# Patient Record
Sex: MALE | Race: ASIAN | NOT HISPANIC OR LATINO | URBAN - METROPOLITAN AREA
[De-identification: names, ages, dates, MRNs, and addresses within clinical notes are randomized per-mention and may not be internally consistent; named-entity substitution may affect disease eponyms.]

---

## 2019-10-09 ENCOUNTER — EMERGENCY (EMERGENCY)
Facility: HOSPITAL | Age: 54
LOS: 1 days | Discharge: HOME | End: 2019-10-09
Admitting: EMERGENCY MEDICINE
Payer: COMMERCIAL

## 2019-10-09 VITALS
OXYGEN SATURATION: 100 % | SYSTOLIC BLOOD PRESSURE: 111 MMHG | RESPIRATION RATE: 18 BRPM | HEART RATE: 77 BPM | DIASTOLIC BLOOD PRESSURE: 92 MMHG | TEMPERATURE: 98 F

## 2019-10-09 DIAGNOSIS — Z98.89 OTHER SPECIFIED POSTPROCEDURAL STATES: Chronic | ICD-10-CM

## 2019-10-09 PROCEDURE — 99284 EMERGENCY DEPT VISIT MOD MDM: CPT

## 2019-10-09 PROCEDURE — 73564 X-RAY EXAM KNEE 4 OR MORE: CPT | Mod: 26,RT

## 2019-10-09 RX ORDER — DICLOFENAC SODIUM 75 MG/1
1 TABLET, DELAYED RELEASE ORAL
Qty: 10 | Refills: 0
Start: 2019-10-09 | End: 2019-10-13

## 2019-10-09 RX ORDER — KETOROLAC TROMETHAMINE 30 MG/ML
30 SYRINGE (ML) INJECTION ONCE
Refills: 0 | Status: DISCONTINUED | OUTPATIENT
Start: 2019-10-09 | End: 2019-10-09

## 2019-10-09 RX ADMIN — Medication 30 MILLIGRAM(S): at 07:54

## 2019-10-09 NOTE — ED PROVIDER NOTE - PATIENT PORTAL LINK FT
You can access the FollowMyHealth Patient Portal offered by [facility] by registering at the following website: [email or web address]. By joining [employer]’s FollowMyHealth portal, you will also be able to view your health information using other applications (apps) compatible with our system.

## 2019-10-09 NOTE — ED ADULT NURSE NOTE - OBJECTIVE STATEMENT
Pt is a 53 year old male reporting tot he ED for right knee pain. Pt reports 3 weeks of right knee pain increasing last night. PT reports pain is 4/10 when sitting. Pt increases to 10/10 when ambulating. PT reports he drove to ED on his own, ambulated to room 10 c. Pt denies trauma or injury to right knee, no swelling or skin breakdown noted. Pt is AOX4. Pt denies chest pain or SOB. PT respirations even an unlabored. Pt appears to be comfortable, in NAD. Pt denies fever, chills, n/v/d. Pt denies abdominal pain, dysuria, hematuria.

## 2019-10-09 NOTE — ED PROVIDER NOTE - CLINICAL SUMMARY MEDICAL DECISION MAKING FREE TEXT BOX
54 y/o male c/o right knee pain x 2 weeks worsening over past 2 days  -probable meniscal/ligamental, r/o bony pathology  -xrays, toradol  -outpt ortho follow up

## 2019-10-09 NOTE — ED ADULT TRIAGE NOTE - CHIEF COMPLAINT QUOTE
C/o Right knee pain since last week which is worse since Yesterday. Pain is severe with activity. No swelling noted.  Also c/o right hip pain. PMH of HTN, cervical disc herniation with laminectomy. Lower back pain.

## 2019-10-09 NOTE — ED PROVIDER NOTE - OBJECTIVE STATEMENT
54 y/o male hx HTN presents to ER c/o right knee pain. Pt. 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 pt. recent trauma or fall. Denies fever chills redness swelling. States has not taken anything for symptoms.

## 2019-10-11 ENCOUNTER — APPOINTMENT (OUTPATIENT)
Dept: ORTHOPEDIC SURGERY | Facility: CLINIC | Age: 54
End: 2019-10-11
Payer: COMMERCIAL

## 2019-10-11 VITALS
HEIGHT: 63 IN | HEART RATE: 86 BPM | WEIGHT: 175 LBS | SYSTOLIC BLOOD PRESSURE: 118 MMHG | BODY MASS INDEX: 31.01 KG/M2 | DIASTOLIC BLOOD PRESSURE: 81 MMHG

## 2019-10-11 DIAGNOSIS — Z86.39 PERSONAL HISTORY OF OTHER ENDOCRINE, NUTRITIONAL AND METABOLIC DISEASE: ICD-10-CM

## 2019-10-11 DIAGNOSIS — M17.11 UNILATERAL PRIMARY OSTEOARTHRITIS, RIGHT KNEE: ICD-10-CM

## 2019-10-11 DIAGNOSIS — M50.20 OTHER CERVICAL DISC DISPLACEMENT, UNSPECIFIED CERVICAL REGION: ICD-10-CM

## 2019-10-11 DIAGNOSIS — Z82.61 FAMILY HISTORY OF ARTHRITIS: ICD-10-CM

## 2019-10-11 DIAGNOSIS — Z80.9 FAMILY HISTORY OF MALIGNANT NEOPLASM, UNSPECIFIED: ICD-10-CM

## 2019-10-11 PROCEDURE — 73564 X-RAY EXAM KNEE 4 OR MORE: CPT | Mod: RT

## 2019-10-11 PROCEDURE — 99204 OFFICE O/P NEW MOD 45 MIN: CPT

## 2019-10-11 RX ORDER — DICLOFENAC SODIUM 75 MG/1
75 TABLET, DELAYED RELEASE ORAL
Qty: 1 | Refills: 0 | Status: ACTIVE | COMMUNITY
Start: 2019-10-11 | End: 1900-01-01

## 2019-10-12 ENCOUNTER — TRANSCRIPTION ENCOUNTER (OUTPATIENT)
Age: 54
End: 2019-10-12

## 2019-10-15 RX ORDER — LISINOPRIL 20 MG/1
20 TABLET ORAL
Refills: 0 | Status: ACTIVE | COMMUNITY

## 2019-10-15 RX ORDER — BROMFENAC 0.76 MG/ML
SOLUTION/ DROPS OPHTHALMIC
Refills: 0 | Status: ACTIVE | COMMUNITY

## 2019-10-15 RX ORDER — DICLOFENAC SODIUM 1 %
KIT TOPICAL
Refills: 0 | Status: ACTIVE | COMMUNITY

## 2021-03-11 ENCOUNTER — APPOINTMENT (OUTPATIENT)
Dept: ORTHOPEDIC SURGERY | Facility: CLINIC | Age: 56
End: 2021-03-11

## 2021-08-07 NOTE — ED ADULT TRIAGE NOTE - HEART RATE (BEATS/MIN)
Patient arrived back from [facility] with cath lab staff - the patient is complaining of 9.5/10 pain in the right arm. Patient awake and talking. VSS. Will treat pain with PRN medication and collaborate with MD as needed to meet pain goal.    77

## 2023-05-07 ENCOUNTER — INPATIENT (INPATIENT)
Facility: HOSPITAL | Age: 58
LOS: 0 days | Discharge: HOME | End: 2023-05-08
Attending: INTERNAL MEDICINE | Admitting: INTERNAL MEDICINE
Payer: COMMERCIAL

## 2023-05-07 VITALS
OXYGEN SATURATION: 100 % | HEART RATE: 126 BPM | SYSTOLIC BLOOD PRESSURE: 132 MMHG | DIASTOLIC BLOOD PRESSURE: 89 MMHG | RESPIRATION RATE: 18 BRPM | TEMPERATURE: 99 F

## 2023-05-07 DIAGNOSIS — R07.9 CHEST PAIN, UNSPECIFIED: ICD-10-CM

## 2023-05-07 DIAGNOSIS — Z98.89 OTHER SPECIFIED POSTPROCEDURAL STATES: Chronic | ICD-10-CM

## 2023-05-07 LAB
ALBUMIN SERPL ELPH-MCNC: 4.1 G/DL — SIGNIFICANT CHANGE UP (ref 3.3–5)
ALP SERPL-CCNC: 57 U/L — SIGNIFICANT CHANGE UP (ref 40–120)
ALT FLD-CCNC: 34 U/L — SIGNIFICANT CHANGE UP (ref 4–41)
ANION GAP SERPL CALC-SCNC: 16 MMOL/L — HIGH (ref 7–14)
AST SERPL-CCNC: 47 U/L — HIGH (ref 4–40)
BASOPHILS # BLD AUTO: 0.1 K/UL — SIGNIFICANT CHANGE UP (ref 0–0.2)
BASOPHILS NFR BLD AUTO: 1.1 % — SIGNIFICANT CHANGE UP (ref 0–2)
BILIRUB SERPL-MCNC: <0.2 MG/DL — SIGNIFICANT CHANGE UP (ref 0.2–1.2)
BUN SERPL-MCNC: 17 MG/DL — SIGNIFICANT CHANGE UP (ref 7–23)
CALCIUM SERPL-MCNC: 8.8 MG/DL — SIGNIFICANT CHANGE UP (ref 8.4–10.5)
CHLORIDE SERPL-SCNC: 105 MMOL/L — SIGNIFICANT CHANGE UP (ref 98–107)
CO2 SERPL-SCNC: 20 MMOL/L — LOW (ref 22–31)
CREAT SERPL-MCNC: 0.87 MG/DL — SIGNIFICANT CHANGE UP (ref 0.5–1.3)
D DIMER BLD IA.RAPID-MCNC: 329 NG/ML DDU — HIGH
EGFR: 99 ML/MIN/1.73M2 — SIGNIFICANT CHANGE UP
EOSINOPHIL # BLD AUTO: 0.37 K/UL — SIGNIFICANT CHANGE UP (ref 0–0.5)
EOSINOPHIL NFR BLD AUTO: 4.1 % — SIGNIFICANT CHANGE UP (ref 0–6)
GLUCOSE SERPL-MCNC: 148 MG/DL — HIGH (ref 70–99)
HCT VFR BLD CALC: 37.8 % — LOW (ref 39–50)
HGB BLD-MCNC: 12.6 G/DL — LOW (ref 13–17)
IANC: 3.53 K/UL — SIGNIFICANT CHANGE UP (ref 1.8–7.4)
IMM GRANULOCYTES NFR BLD AUTO: 0.1 % — SIGNIFICANT CHANGE UP (ref 0–0.9)
LIDOCAIN IGE QN: 64 U/L — HIGH (ref 7–60)
LYMPHOCYTES # BLD AUTO: 4.29 K/UL — HIGH (ref 1–3.3)
LYMPHOCYTES # BLD AUTO: 47.1 % — HIGH (ref 13–44)
MAGNESIUM SERPL-MCNC: 1.8 MG/DL — SIGNIFICANT CHANGE UP (ref 1.6–2.6)
MCHC RBC-ENTMCNC: 27.2 PG — SIGNIFICANT CHANGE UP (ref 27–34)
MCHC RBC-ENTMCNC: 33.3 GM/DL — SIGNIFICANT CHANGE UP (ref 32–36)
MCV RBC AUTO: 81.5 FL — SIGNIFICANT CHANGE UP (ref 80–100)
MONOCYTES # BLD AUTO: 0.8 K/UL — SIGNIFICANT CHANGE UP (ref 0–0.9)
MONOCYTES NFR BLD AUTO: 8.8 % — SIGNIFICANT CHANGE UP (ref 2–14)
NEUTROPHILS # BLD AUTO: 3.53 K/UL — SIGNIFICANT CHANGE UP (ref 1.8–7.4)
NEUTROPHILS NFR BLD AUTO: 38.8 % — LOW (ref 43–77)
NRBC # BLD: 0 /100 WBCS — SIGNIFICANT CHANGE UP (ref 0–0)
NRBC # FLD: 0 K/UL — SIGNIFICANT CHANGE UP (ref 0–0)
NT-PROBNP SERPL-SCNC: 30 PG/ML — SIGNIFICANT CHANGE UP
PLATELET # BLD AUTO: 159 K/UL — SIGNIFICANT CHANGE UP (ref 150–400)
POTASSIUM SERPL-MCNC: 4.6 MMOL/L — SIGNIFICANT CHANGE UP (ref 3.5–5.3)
POTASSIUM SERPL-SCNC: 4.6 MMOL/L — SIGNIFICANT CHANGE UP (ref 3.5–5.3)
PROT SERPL-MCNC: 6.8 G/DL — SIGNIFICANT CHANGE UP (ref 6–8.3)
RBC # BLD: 4.64 M/UL — SIGNIFICANT CHANGE UP (ref 4.2–5.8)
RBC # FLD: 14.5 % — SIGNIFICANT CHANGE UP (ref 10.3–14.5)
SODIUM SERPL-SCNC: 141 MMOL/L — SIGNIFICANT CHANGE UP (ref 135–145)
TROPONIN T, HIGH SENSITIVITY RESULT: 6 NG/L — SIGNIFICANT CHANGE UP
TROPONIN T, HIGH SENSITIVITY RESULT: 8 NG/L — SIGNIFICANT CHANGE UP
TSH SERPL-MCNC: 0.76 UIU/ML — SIGNIFICANT CHANGE UP (ref 0.27–4.2)
WBC # BLD: 9.1 K/UL — SIGNIFICANT CHANGE UP (ref 3.8–10.5)
WBC # FLD AUTO: 9.1 K/UL — SIGNIFICANT CHANGE UP (ref 3.8–10.5)

## 2023-05-07 PROCEDURE — 70450 CT HEAD/BRAIN W/O DYE: CPT | Mod: 26,MA

## 2023-05-07 PROCEDURE — 99285 EMERGENCY DEPT VISIT HI MDM: CPT

## 2023-05-07 PROCEDURE — 71250 CT THORAX DX C-: CPT | Mod: 26,MA

## 2023-05-07 PROCEDURE — 71046 X-RAY EXAM CHEST 2 VIEWS: CPT | Mod: 26

## 2023-05-07 PROCEDURE — 93970 EXTREMITY STUDY: CPT | Mod: 26

## 2023-05-07 RX ORDER — ASPIRIN/CALCIUM CARB/MAGNESIUM 324 MG
162 TABLET ORAL ONCE
Refills: 0 | Status: COMPLETED | OUTPATIENT
Start: 2023-05-07 | End: 2023-05-07

## 2023-05-07 RX ORDER — SODIUM CHLORIDE 9 MG/ML
1000 INJECTION, SOLUTION INTRAVENOUS ONCE
Refills: 0 | Status: COMPLETED | OUTPATIENT
Start: 2023-05-07 | End: 2023-05-07

## 2023-05-07 RX ADMIN — SODIUM CHLORIDE 1000 MILLILITER(S): 9 INJECTION, SOLUTION INTRAVENOUS at 18:49

## 2023-05-07 RX ADMIN — SODIUM CHLORIDE 1000 MILLILITER(S): 9 INJECTION, SOLUTION INTRAVENOUS at 21:56

## 2023-05-07 RX ADMIN — Medication 162 MILLIGRAM(S): at 20:21

## 2023-05-07 NOTE — ED ADULT NURSE NOTE - OBJECTIVE STATEMENT
Pt AOX4 c/o chest pain, started yesterday, got worse today; pt's wife also reports that pt seemed especially fatigued today and didn't want to come to ER; intermittent SOB when chest pain is strong; at this time pain is 5/10; breathing regular and unlabored; NSR on monitor; pt has severe allergy to contrast die:  went into cardiac arrest when given dye in the past; pt had a recent double hernia repair surgery on April 19 ambulatory surgery; surgery was unremarkable, incisions still healing, dry and clean; no calf pain, no long trips, no smoking. 20G placed in Left AC, labs drawn and sent, awaiting further MD orders.

## 2023-05-07 NOTE — ED ADULT NURSE REASSESSMENT NOTE - NS ED NURSE REASSESS COMMENT FT1
Patient is awake and alert, appears comfortable and in no apparent distress. Presented to ED for chest pain, weakness and lower extremity pain. Denies any s/sx at this time. Awaiting further orders from MD.

## 2023-05-07 NOTE — ED ADULT TRIAGE NOTE - CHIEF COMPLAINT QUOTE
Presents to ED c/o chest pain x 2 days. As per daughter he is having episodes of confusion. Pt A&Ox4 in triage, no neuro deficits noted. Sent from cardiologist. PMH of HTN, HLD, DM2.

## 2023-05-07 NOTE — ED PROVIDER NOTE - OBJECTIVE STATEMENT
57-year-old male with past medical history of hypertension, hyperlipidemia, diabetes presenting with left-sided chest pain since yesterday. Patient states he began having pain in the left side of his chest which was pressure-like with radiation down his left arm. Patient states he took some aspirin with mild improvement of the chest pain. Family notes that patient has not been acting like himself for the last 2 days. States that has been more forgetful than usual. Patient states that he also had some pain in his right leg which lasted for few hours and went away. He denies nausea, vomiting, syncope. He denies fever or chills. Patient states he had bilateral hernia repair about 2 weeks prior. Patient states he is not having chest pain right now tachycardic on triage heart rate 133. Patient reports anaphylaxis to IV contrast 20 years prior

## 2023-05-07 NOTE — ED PROVIDER NOTE - CLINICAL SUMMARY MEDICAL DECISION MAKING FREE TEXT BOX
57-year-old male with past medical history of hypertension, hyperlipidemia, diabetes presenting with left-sided chest pain since yesterday. Patient states he began having pain in the left side of his chest which was pressure-like with radiation down his left arm.Patient with chest pain along with tachycardia with recent leg pain as well. Concern for possible DVT, PE patient with anaphylaxis to IV contrast. We'll check D-dimer, bilateral ultrasounds lower duplex. Patient also with chest pain potential of ACS. Patient's heart score is 4+ will obtain CBC, CMP, troponin, PT/INR, will give patient aspirin. Will obtain CT head and CT chest as patient complained of complaint of chest pain and confusion per family. Low suspicion of aortic dissection but will check bilateral upper extremity blood pressures absolute contraindication to IV contrast may require additional inpatient testing will require admission with telemetry. Likely echo

## 2023-05-07 NOTE — ED PROVIDER NOTE - PHYSICAL EXAMINATION
Gen: Awake, Alert, WD, WN, NAD  Head:  NC/AT  Eyes:  PERRL, EOMI, Conjunctiva pink, lids normal, no scleral icterus  ENT: OP clear, no exudates, no erythema, uvula midline, TMs clear bilaterally, moist mucus membranes  Neck: supple, nontender, no meningismus, no JVD, trachea midline  Cardiac/CV:  S1 S2, tachycardic no M/G/R  Respiratory/Pulm:  CTAB, good air movement, normal resp effort, no wheezes/stridor/retractions/rales/rhonchi  Gastrointestinal/Abdomen:  Soft, nontender, nondistended, +BS, no rebound/guarding  Back:  no CVAT, no MLT  Ext:  warm, well perfused, moving all extremities spontaneously, no peripheral edema, distal pulses intact  Skin: b/l inguinal hernia  incisions c/d/i   Neuro:  AAOx3, sensation intact, motor 5/5 x 4 extremities, normal gait, speech clear

## 2023-05-07 NOTE — ED PROVIDER NOTE - NS ED ROS FT
denies fever, chills, +chest pain,+ SOB, abdominal pain, diarrhea, dysuria, syncope, bleeding, new rash,weakness, numbness, blurred vision    ROS  otherwise negative as per HPI

## 2023-05-07 NOTE — ED PROVIDER NOTE - ATTENDING CONTRIBUTION TO CARE
attending wrote note  Dr. Lee: I have personally seen and examined this patient at the bedside. I have fully participated in the care of this patient. I have reviewed all pertinent clinical information, including history, physical exam, plan and the Resident's note and agree except as noted. HPI above as by me. PE above as by me. DDX PLAN

## 2023-05-08 ENCOUNTER — TRANSCRIPTION ENCOUNTER (OUTPATIENT)
Age: 58
End: 2023-05-08

## 2023-05-08 VITALS
DIASTOLIC BLOOD PRESSURE: 91 MMHG | SYSTOLIC BLOOD PRESSURE: 132 MMHG | RESPIRATION RATE: 18 BRPM | HEART RATE: 102 BPM | OXYGEN SATURATION: 100 %

## 2023-05-08 DIAGNOSIS — E11.9 TYPE 2 DIABETES MELLITUS WITHOUT COMPLICATIONS: ICD-10-CM

## 2023-05-08 DIAGNOSIS — E78.5 HYPERLIPIDEMIA, UNSPECIFIED: ICD-10-CM

## 2023-05-08 DIAGNOSIS — Z79.899 OTHER LONG TERM (CURRENT) DRUG THERAPY: ICD-10-CM

## 2023-05-08 DIAGNOSIS — R91.8 OTHER NONSPECIFIC ABNORMAL FINDING OF LUNG FIELD: ICD-10-CM

## 2023-05-08 DIAGNOSIS — R07.9 CHEST PAIN, UNSPECIFIED: ICD-10-CM

## 2023-05-08 DIAGNOSIS — I10 ESSENTIAL (PRIMARY) HYPERTENSION: ICD-10-CM

## 2023-05-08 DIAGNOSIS — Z29.9 ENCOUNTER FOR PROPHYLACTIC MEASURES, UNSPECIFIED: ICD-10-CM

## 2023-05-08 LAB
ALBUMIN SERPL ELPH-MCNC: 3.7 G/DL — SIGNIFICANT CHANGE UP (ref 3.3–5)
ALP SERPL-CCNC: 49 U/L — SIGNIFICANT CHANGE UP (ref 40–120)
ALT FLD-CCNC: 28 U/L — SIGNIFICANT CHANGE UP (ref 4–41)
ANION GAP SERPL CALC-SCNC: 12 MMOL/L — SIGNIFICANT CHANGE UP (ref 7–14)
AST SERPL-CCNC: 29 U/L — SIGNIFICANT CHANGE UP (ref 4–40)
BILIRUB SERPL-MCNC: 0.7 MG/DL — SIGNIFICANT CHANGE UP (ref 0.2–1.2)
BUN SERPL-MCNC: 18 MG/DL — SIGNIFICANT CHANGE UP (ref 7–23)
CALCIUM SERPL-MCNC: 8.6 MG/DL — SIGNIFICANT CHANGE UP (ref 8.4–10.5)
CHLORIDE SERPL-SCNC: 104 MMOL/L — SIGNIFICANT CHANGE UP (ref 98–107)
CHOLEST SERPL-MCNC: 176 MG/DL — SIGNIFICANT CHANGE UP
CO2 SERPL-SCNC: 23 MMOL/L — SIGNIFICANT CHANGE UP (ref 22–31)
CREAT SERPL-MCNC: 0.95 MG/DL — SIGNIFICANT CHANGE UP (ref 0.5–1.3)
EGFR: 92 ML/MIN/1.73M2 — SIGNIFICANT CHANGE UP
GLUCOSE SERPL-MCNC: 149 MG/DL — HIGH (ref 70–99)
HDLC SERPL-MCNC: 86 MG/DL — SIGNIFICANT CHANGE UP
LIPID PNL WITH DIRECT LDL SERPL: 75 MG/DL — SIGNIFICANT CHANGE UP
NON HDL CHOLESTEROL: 90 MG/DL — SIGNIFICANT CHANGE UP
POTASSIUM SERPL-MCNC: 4 MMOL/L — SIGNIFICANT CHANGE UP (ref 3.5–5.3)
POTASSIUM SERPL-SCNC: 4 MMOL/L — SIGNIFICANT CHANGE UP (ref 3.5–5.3)
PROT SERPL-MCNC: 6.2 G/DL — SIGNIFICANT CHANGE UP (ref 6–8.3)
SODIUM SERPL-SCNC: 139 MMOL/L — SIGNIFICANT CHANGE UP (ref 135–145)
TRIGL SERPL-MCNC: 73 MG/DL — SIGNIFICANT CHANGE UP
TROPONIN T, HIGH SENSITIVITY RESULT: 7 NG/L — SIGNIFICANT CHANGE UP

## 2023-05-08 PROCEDURE — 99223 1ST HOSP IP/OBS HIGH 75: CPT

## 2023-05-08 PROCEDURE — 93306 TTE W/DOPPLER COMPLETE: CPT | Mod: 26

## 2023-05-08 PROCEDURE — 78582 LUNG VENTILAT&PERFUS IMAGING: CPT | Mod: 26,GC

## 2023-05-08 RX ORDER — DEXTROSE 50 % IN WATER 50 %
15 SYRINGE (ML) INTRAVENOUS ONCE
Refills: 0 | Status: DISCONTINUED | OUTPATIENT
Start: 2023-05-08 | End: 2023-05-08

## 2023-05-08 RX ORDER — LISINOPRIL 2.5 MG/1
20 TABLET ORAL DAILY
Refills: 0 | Status: DISCONTINUED | OUTPATIENT
Start: 2023-05-08 | End: 2023-05-08

## 2023-05-08 RX ORDER — SODIUM CHLORIDE 9 MG/ML
1000 INJECTION, SOLUTION INTRAVENOUS
Refills: 0 | Status: DISCONTINUED | OUTPATIENT
Start: 2023-05-08 | End: 2023-05-08

## 2023-05-08 RX ORDER — ROSUVASTATIN CALCIUM 5 MG/1
1 TABLET ORAL
Refills: 0 | DISCHARGE

## 2023-05-08 RX ORDER — INSULIN LISPRO 100/ML
VIAL (ML) SUBCUTANEOUS AT BEDTIME
Refills: 0 | Status: DISCONTINUED | OUTPATIENT
Start: 2023-05-08 | End: 2023-05-08

## 2023-05-08 RX ORDER — INSULIN LISPRO 100/ML
VIAL (ML) SUBCUTANEOUS
Refills: 0 | Status: DISCONTINUED | OUTPATIENT
Start: 2023-05-08 | End: 2023-05-08

## 2023-05-08 RX ORDER — ENOXAPARIN SODIUM 100 MG/ML
40 INJECTION SUBCUTANEOUS EVERY 24 HOURS
Refills: 0 | Status: DISCONTINUED | OUTPATIENT
Start: 2023-05-08 | End: 2023-05-08

## 2023-05-08 RX ORDER — DEXTROSE 50 % IN WATER 50 %
25 SYRINGE (ML) INTRAVENOUS ONCE
Refills: 0 | Status: DISCONTINUED | OUTPATIENT
Start: 2023-05-08 | End: 2023-05-08

## 2023-05-08 RX ORDER — ASPIRIN/CALCIUM CARB/MAGNESIUM 324 MG
81 TABLET ORAL DAILY
Refills: 0 | Status: DISCONTINUED | OUTPATIENT
Start: 2023-05-08 | End: 2023-05-08

## 2023-05-08 RX ORDER — ATORVASTATIN CALCIUM 80 MG/1
80 TABLET, FILM COATED ORAL AT BEDTIME
Refills: 0 | Status: DISCONTINUED | OUTPATIENT
Start: 2023-05-08 | End: 2023-05-08

## 2023-05-08 RX ORDER — ASPIRIN/CALCIUM CARB/MAGNESIUM 324 MG
1 TABLET ORAL
Qty: 0 | Refills: 0 | DISCHARGE
Start: 2023-05-08

## 2023-05-08 RX ORDER — LISINOPRIL 2.5 MG/1
1 TABLET ORAL
Refills: 0 | DISCHARGE

## 2023-05-08 RX ORDER — GLUCAGON INJECTION, SOLUTION 0.5 MG/.1ML
1 INJECTION, SOLUTION SUBCUTANEOUS ONCE
Refills: 0 | Status: DISCONTINUED | OUTPATIENT
Start: 2023-05-08 | End: 2023-05-08

## 2023-05-08 RX ORDER — METFORMIN HYDROCHLORIDE 850 MG/1
1 TABLET ORAL
Refills: 0 | DISCHARGE

## 2023-05-08 RX ORDER — DEXTROSE 50 % IN WATER 50 %
12.5 SYRINGE (ML) INTRAVENOUS ONCE
Refills: 0 | Status: DISCONTINUED | OUTPATIENT
Start: 2023-05-08 | End: 2023-05-08

## 2023-05-08 RX ADMIN — Medication 81 MILLIGRAM(S): at 12:29

## 2023-05-08 RX ADMIN — ENOXAPARIN SODIUM 40 MILLIGRAM(S): 100 INJECTION SUBCUTANEOUS at 12:30

## 2023-05-08 RX ADMIN — LISINOPRIL 20 MILLIGRAM(S): 2.5 TABLET ORAL at 05:26

## 2023-05-08 NOTE — H&P ADULT - NSHPLABSRESULTS_GEN_ALL_CORE
12.6   9.10  )-----------( 159      ( 07 May 2023 18:15 )             37.8     05-07    141  |  105  |  17  ----------------------------<  148<H>  4.6   |  20<L>  |  0.87    Ca    8.8      07 May 2023 18:15  Mg     1.80     05-07    TPro  6.8  /  Alb  4.1  /  TBili  <0.2  /  DBili  x   /  AST  47<H>  /  ALT  34  /  AlkPhos  57  05-07    CAPILLARY BLOOD GLUCOSE      POCT Blood Glucose.: 143 mg/dL (07 May 2023 17:40)        Vital Signs Last 24 Hrs  T(C): 36.7 (08 May 2023 00:54), Max: 37.1 (07 May 2023 17:34)  T(F): 98.1 (08 May 2023 00:54), Max: 98.8 (07 May 2023 17:34)  HR: 91 (08 May 2023 00:54) (91 - 126)  BP: 155/100 (08 May 2023 00:54) (132/89 - 164/80)  BP(mean): --  RR: 17 (08 May 2023 00:54) (17 - 18)  SpO2: 100% (08 May 2023 00:54) (100% - 100%)    Parameters below as of 08 May 2023 00:54  Patient On (Oxygen Delivery Method): room air

## 2023-05-08 NOTE — DISCHARGE NOTE PROVIDER - CARE PROVIDER_API CALL
Sudha Victoria  INTERNAL MEDICINE  [address] [place] [address]  Phone: [phone]  Fax: [phone]  Established Patient  Follow Up Time: 1 week    Sae Fulton)  Internal Medicine  [address] [place] [address]  Phone: [phone]  Fax: [phone]  Established Patient  Follow Up Time: 1 week

## 2023-05-08 NOTE — CONSULT NOTE ADULT - SUBJECTIVE AND OBJECTIVE BOX
Cardiovascular Disease Initial Evaluation  Date of Service: 05-08-23 @ 09:28    CHIEF COMPLAINT: Chest pain    HISTORY OF PRESENT ILLNESS:  This is a 57 year old man, former smoker, HTN, HLD, and DM2 who presented to [facility] on 5/7/2023 with 2 episodes of non-exertional left sided cp radiating to arm in last 1-2 days. Each episode lasted approx. 1 hour and subsided spontaneously. Denies fever, cough, leg swelling. Reported right  leg pain to ed earlier, duplex performed negative for dvt.  EKG LAFB, (seen on 2011 ekg) sinus tach 118 bpm. Pt does report feeling anxious currently. HS trop 6, 8. Does not take aspirin daily. Denies cp now. CT chest non-con unremarkable save for right sided pulm nodules   CT head performed as family  reported to ed pt not acting himself recently; CT head unremarkable, pt a/o x 3 on my exam, somewhat anxious affect    Allergies  iodine (Anaphylaxis)  iodine (Anaphylaxis)        MEDICATIONS:  aspirin enteric coated 81 milliGRAM(s) Oral daily  enoxaparin Injectable 40 milliGRAM(s) SubCutaneous every 24 hours  lisinopril 20 milliGRAM(s) Oral daily            atorvastatin 80 milliGRAM(s) Oral at bedtime  dextrose 50% Injectable 25 Gram(s) IV Push once  dextrose 50% Injectable 12.5 Gram(s) IV Push once  dextrose 50% Injectable 25 Gram(s) IV Push once  dextrose Oral Gel 15 Gram(s) Oral once PRN  glucagon  Injectable 1 milliGRAM(s) IntraMuscular once  insulin lispro (ADMELOG) corrective regimen sliding scale   SubCutaneous three times a day before meals  insulin lispro (ADMELOG) corrective regimen sliding scale   SubCutaneous at bedtime    dextrose 5%. 1000 milliLiter(s) IV Continuous <Continuous>  dextrose 5%. 1000 milliLiter(s) IV Continuous <Continuous>      PAST MEDICAL & SURGICAL HISTORY:  DM (diabetes mellitus)      HTN (hypertension)      HLD (hyperlipidemia)      History of cervical spinal surgery  disectiony/fusion feb 2016; 8/2016 - laminectomy of @[facility];          FAMILY HISTORY:  No pertinent family history in first degree relatives        SOCIAL HISTORY:    The patient is a nonsmoker       REVIEW OF SYSTEMS:  See HPI, otherwise complete 14 point review of systems negative       PHYSICAL EXAM:  T(C): 36.4 (05-08-23 @ 07:30), Max: 37.1 (05-07-23 @ 17:34)  HR: 98 (05-08-23 @ 07:30) (91 - 126)  BP: 150/95 (05-08-23 @ 07:30) (132/89 - 164/80)  RR: 20 (05-08-23 @ 07:30) (17 - 20)  SpO2: 100% (05-08-23 @ 07:30) (99% - 100%)  Wt(kg): --  I&O's Summary      Appearance: No Acute Distress; resting comfortably  HEENT:  Normal oral mucosa, PERRL, EOMI	  Cardiovascular: Normal S1 S2, No JVD, No murmurs/rubs/gallops  Respiratory: Normal respiratory effort; Lungs clear to auscultation bilaterally  Gastrointestinal:  Soft, Non-tender, + BS	  Skin: No rashes, No ecchymoses, No cyanosis	  Neurologic: Non-focal; no weakness  Extremities: No clubbing, cyanosis or edema  Vascular: Peripheral pulses palpable 2+ bilaterally  Psychiatry: A & O x 3, Mood & affect appropriate    Laboratory Data:	 	    CBC Full  -  ( 07 May 2023 18:15 )  WBC Count : 9.10 K/uL  Hemoglobin : 12.6 g/dL  Hematocrit : 37.8 %  Platelet Count - Automated : 159 K/uL  Mean Cell Volume : 81.5 fL  Mean Cell Hemoglobin : 27.2 pg  Mean Cell Hemoglobin Concentration : 33.3 gm/dL  Auto Neutrophil # : 3.53 K/uL  Auto Lymphocyte # : 4.29 K/uL  Auto Monocyte # : 0.80 K/uL  Auto Eosinophil # : 0.37 K/uL  Auto Basophil # : 0.10 K/uL  Auto Neutrophil % : 38.8 %  Auto Lymphocyte % : 47.1 %  Auto Monocyte % : 8.8 %  Auto Eosinophil % : 4.1 %  Auto Basophil % : 1.1 %    05-08    139  |  104  |  18  ----------------------------<  149<H>  4.0   |  23  |  0.95  05-07    141  |  105  |  17  ----------------------------<  148<H>  4.6   |  20<L>  |  0.87    Ca    8.6      08 May 2023 07:10  Ca    8.8      07 May 2023 18:15  Mg     1.80     05-07    TPro  6.2  /  Alb  3.7  /  TBili  0.7  /  DBili  x   /  AST  29  /  ALT  28  /  AlkPhos  49  05-08  TPro  6.8  /  Alb  4.1  /  TBili  <0.2  /  DBili  x   /  AST  47<H>  /  ALT  34  /  AlkPhos  57  05-07          Interpretation of Telemetry: Sinus tachycardia	    ECG:  	Sinus tachycardia; LAFB    Assessment: 57 year old man, former smoker, HTN, HLD, and DM2 presents with chest pain.     Plan of Care:    #Chest pain-  Atypical for angina and patient has ruled out for ACS.   EKG shows a pre-existing LAFB.  Of note, Mr. Spivey has been persistently tachycardic on telemetry.  In my office, his HR is in the 60s.  Given atypical chest pain and elevated D-dimer, I will obtain a V/Q scan to assess for PE.  Patient with anaphylaxis to IV contrast.  Obtain echo.    #HTN-  BP elevated.  Continue lisinopril.  Hold off on up titration of meds until PE is ruled out.       Care discussed at length with patient in the ED.     72 minutes spent on total encounter; more than 50% of the visit was spent counseling and/or coordinating care by the attending physician.   	  Sae Fulton MD [facility]  Cardiovascular Diseases  [phone]    
    Patient is a 57y old  Male who presents with a chief complaint of chest pain       HPI:  58 yo m former smoker, HTN, HLD, DM2 presenting with 2 episodes of non-exertional left sided cp radiating to arm in last 1-2 days. Each episode lasted approx. 1 hour and subsided spontaneously. Denies fever, cough, leg swelling. Reported right  leg pain to ed earlier, duplex performed negative for dvt.  EKG LAFB, (seen on 2011 ekg) sinus tach 118 bpm. Pt does report feeling anxious currently. HS trop 6, 8. Does not take aspirin daily. Denies cp now.     Feeling better now. Wife in room.       PAST MEDICAL & SURGICAL HISTORY:  DM (diabetes mellitus)      HTN (hypertension)      HLD (hyperlipidemia)      History of cervical spinal surgery  disectiony/fusion feb 2016; 8/2016 - laminectomy of @[facility];          Social History: Quit smoking 25 years ago.     FAMILY HISTORY:  No pertinent family history in first degree relatives        Allergies    iodine (Anaphylaxis)  iodine (Anaphylaxis)    Intolerances        REVIEW OF SYSTEMS:    CONSTITUTIONAL: No fever, weight loss, or fatigue  EYES: No eye pain, visual disturbances, or discharge  RESPIRATORY: No cough, wheezing, chills or hemoptysis; No shortness of breath  CARDIOVASCULAR: No chest pain, palpitations, dizziness, or leg swelling  GASTROINTESTINAL: No abdominal or epigastric pain. No nausea, vomiting, or hematemesis; No diarrhea or constipation. No melena or hematochezia.  GENITOURINARY: No dysuria, frequency, hematuria, or incontinence  NEUROLOGICAL: No headaches, memory loss, loss of strength, numbness, or tremors  SKIN: No itching, burning, rashes, or lesions   ENDOCRINE: No heat or cold intolerance; No hair loss  MUSCULOSKELETAL: No joint pain or swelling; No muscle, back, or extremity pain  PSYCHIATRIC: No depression, anxiety, mood swings, or difficulty sleeping      MEDICATIONS  (STANDING):  aspirin enteric coated 81 milliGRAM(s) Oral daily  atorvastatin 80 milliGRAM(s) Oral at bedtime  dextrose 5%. 1000 milliLiter(s) (100 mL/Hr) IV Continuous <Continuous>  dextrose 5%. 1000 milliLiter(s) (50 mL/Hr) IV Continuous <Continuous>  dextrose 50% Injectable 25 Gram(s) IV Push once  dextrose 50% Injectable 12.5 Gram(s) IV Push once  dextrose 50% Injectable 25 Gram(s) IV Push once  enoxaparin Injectable 40 milliGRAM(s) SubCutaneous every 24 hours  glucagon  Injectable 1 milliGRAM(s) IntraMuscular once  insulin lispro (ADMELOG) corrective regimen sliding scale   SubCutaneous three times a day before meals  insulin lispro (ADMELOG) corrective regimen sliding scale   SubCutaneous at bedtime  lisinopril 20 milliGRAM(s) Oral daily    MEDICATIONS  (PRN):  dextrose Oral Gel 15 Gram(s) Oral once PRN Blood Glucose LESS THAN 70 milliGRAM(s)/deciliter      Vital Signs Last 24 Hrs  T(C): 36.9 (08 May 2023 12:24), Max: 37.1 (07 May 2023 17:34)  T(F): 98.4 (08 May 2023 12:24), Max: 98.8 (07 May 2023 17:34)  HR: 102 (08 May 2023 16:46) (91 - 126)  BP: 132/91 (08 May 2023 16:46) (132/89 - 164/80)  BP(mean): --  RR: 18 (08 May 2023 16:46) (17 - 20)  SpO2: 100% (08 May 2023 16:46) (99% - 100%)    Parameters below as of 08 May 2023 16:46  Patient On (Oxygen Delivery Method): room air        PHYSICAL EXAM:    GENERAL: NAD, well-groomed, well-developed  HEAD:  Atraumatic, Normocephalic  EYES: EOMI, PERRLA, conjunctiva and sclera clear  ENMT: No tonsillar erythema, exudates, or enlargement; Moist mucous membranes, Good dentition, No lesions  NECK: Supple, No JVD, Normal thyroid  NERVOUS SYSTEM:  Alert & Oriented X3, No new  focal sign .  CHEST/LUNG: Air entry good bilaterally; No rales, rhonchi, wheezing, or rubs  HEART: Regular rate and rhythm; No murmurs, rubs, or gallops  ABDOMEN: Soft, Nontender, Nondistended; Bowel sounds present  EXTREMITIES:  2+ Peripheral Pulses, No clubbing, cyanosis, or edema      LABS:                        12.6   9.10  )-----------( 159      ( 07 May 2023 18:15 )             37.8     05-08    139  |  104  |  18  ----------------------------<  149<H>  4.0   |  23  |  0.95    Ca    8.6      08 May 2023 07:10  Mg     1.80     05-07    TPro  6.2  /  Alb  3.7  /  TBili  0.7  /  DBili  x   /  AST  29  /  ALT  28  /  AlkPhos  49  05-08            RADIOLOGY & ADDITIONAL STUDIES:

## 2023-05-08 NOTE — DISCHARGE NOTE PROVIDER - PROVIDER TOKENS
PROVIDER:[TOKEN:[[id number]],FOLLOWUP:[1 week],ESTABLISHEDPATIENT:[T]],PROVIDER:[TOKEN:[[id number]],FOLLOWUP:[1 week],ESTABLISHEDPATIENT:[T]]

## 2023-05-08 NOTE — DISCHARGE NOTE PROVIDER - NSDCMRMEDTOKEN_GEN_ALL_CORE_FT
aspirin 81 mg oral delayed release tablet: 1 tab(s) orally once a day  lisinopril 20 mg oral tablet: 1 orally  metFORMIN 500 mg oral tablet: 1 orally  rosuvastatin 20 mg oral capsule: 1 orally

## 2023-05-08 NOTE — DISCHARGE NOTE NURSING/CASE MANAGEMENT/SOCIAL WORK - PATIENT PORTAL LINK FT
You can access the FollowMyHealth Patient Portal offered by [facility] by registering at the following website: [email or web address]. By joining [employer]’s FollowMyHealth portal, you will also be able to view your health information using other applications (apps) compatible with our system.

## 2023-05-08 NOTE — DISCHARGE NOTE PROVIDER - HOSPITAL COURSE
56 yo m former smoker, HTN, HLD, DM2 presenting with 2 episodes of non-exertional left sided cp radiating to arm in last 1-2 days. Each episode lasted approx. 1 hour and subsided spontaneously. Denies fever, cough, leg swelling. Reported right  leg pain to ed earlier, duplex performed negative for dvt.  EKG LAFB, (seen on 2011 ekg) sinus tach 118 bpm. Pt did report feeling anxious. HS trop 6, 8. Does not take aspirin daily. Denies cp now. Feeling better now. Wife in room.      Problem/Plan - 1:  ·  Problem: chest pain with tachycardia .   ·  Plan: -Had hernia surgery 2 weeks back. R/O PE.  - Doppler legs negative.   - VQ Scan: Very low probability of pulmonary embolus.  - Given multiple cad risk factors, ischemic work up in progress. trop x 3 negative.   -Checking TFT also.  -cardiology help appreciated.   < from: Transthoracic Echocardiogram (05.08.23 @ 08:34) >  CONCLUSIONS:  1. Calcified trileaflet aortic valve with normal opening.  2. Normal left ventricular internal dimensions and wall  thicknesses.  3. Normal left ventricular systolic function. No segmental  wall motion abnormalities.  4. Normal left ventricular diastolic function.  5. Normal right ventricular size and function.3D RV EF 50%       Problem/Plan - 2:  ·  Problem: DM (diabetes mellitus).   ·  Plan: ISS. Sugars in good range.      Problem/Plan - 3:  ·  Problem: HTN (hypertension).   ·  Plan: BP readings better.      Problem/Plan - 4:  ·  Problem: HLD (hyperlipidemia).   ·  Plan: c/w statin.     Problem/Plan - 5:  ·  Problem: Lung nodules.   ·  Plan: Ex smoker oupt surveillance.     Problem/Plan - 6:  ·  Problem: Encounter for deep vein thrombosis (DVT) prophylaxis.   ·  Plan: Lovenox.    On 5/8/23 this case was reviewed with Dr. Fulton the patient is medically stable and optimized for discharge.    58 yo m former smoker, HTN, HLD, DM2 presenting with 2 episodes of non-exertional left sided cp radiating to arm in last 1-2 days. Each episode lasted approx. 1 hour and subsided spontaneously. Denies fever, cough, leg swelling. Reported right  leg pain to ed earlier, duplex performed negative for dvt.  EKG LAFB, (seen on 2011 ekg) sinus tach 118 bpm. Pt did report feeling anxious. HS trop 6, 8. Does not take aspirin daily. Denies cp now. Feeling better now. Wife in room.      Problem/Plan - 1:  ·  Problem: chest pain with tachycardia .   ·  Plan: -Had hernia surgery 2 weeks back. R/O PE.  - Doppler legs negative.   - VQ Scan: Very low probability of pulmonary embolus.  - Given multiple cad risk factors, ischemic work up in progress. trop x 3 negative.   -Checking TFT also.  -cardiology help appreciated.   < from: Transthoracic Echocardiogram (05.08.23 @ 08:34) >  CONCLUSIONS:  1. Calcified trileaflet aortic valve with normal opening.  2. Normal left ventricular internal dimensions and wall  thicknesses.  3. Normal left ventricular systolic function. No segmental  wall motion abnormalities.  4. Normal left ventricular diastolic function.  5. Normal right ventricular size and function.3D RV EF 50%       Problem/Plan - 2:  ·  Problem: DM (diabetes mellitus).   ·  Plan: ISS. Sugars in good range.      Problem/Plan - 3:  ·  Problem: HTN (hypertension).   ·  Plan: BP readings better.      Problem/Plan - 4:  ·  Problem: HLD (hyperlipidemia).   ·  Plan: c/w statin.     Problem/Plan - 5:  ·  Problem: Lung nodules.   ·  Plan: Ex smoker oupt surveillance.     Problem/Plan - 6:  ·  Problem: Encounter for deep vein thrombosis (DVT) prophylaxis.   ·  Plan: Lovenox.      On 5/8/23 this case was reviewed with Dr. Fulton the patient is medically stable and optimized for discharge.

## 2023-05-08 NOTE — H&P ADULT - NSICDXPASTSURGICALHX_GEN_ALL_CORE_FT
PAST SURGICAL HISTORY:  History of cervical spinal surgery disectiony/fusion feb 2016; 8/2016 - laminectomy of @[facility];

## 2023-05-08 NOTE — H&P ADULT - PROBLEM SELECTOR PLAN 1
-given multiple cad risk factors, will order exercise tress test  -if +, will need to address iodine allergy prior to cath  -pt not on daily aspirin, but will place for now  -lipid panel, a1c ordered  -tele, TTE

## 2023-05-08 NOTE — PATIENT PROFILE ADULT - FALL HARM RISK - UNIVERSAL INTERVENTIONS
Bed in lowest position, wheels locked, appropriate side rails in place/Call bell, personal items and telephone in reach/Instruct patient to call for assistance before getting out of bed or chair/Non-slip footwear when patient is out of bed/[place] to call system/Physically safe environment - no spills, clutter or unnecessary equipment/Purposeful Proactive Rounding/Room/bathroom lighting operational, light cord in reach

## 2023-05-08 NOTE — CONSULT NOTE ADULT - ASSESSMENT
56 yo m former smoker, HTN, HLD, DM2 presenting with 2 episodes of non-exertional left sided cp radiating to arm in last 1-2 days. Each episode lasted approx. 1 hour and subsided spontaneously. Denies fever, cough, leg swelling. Reported right  leg pain to ed earlier, duplex performed negative for dvt.  EKG LAFB, (seen on 2011 ekg) sinus tach 118 bpm. Pt does report feeling anxious currently. HS trop 6, 8. Does not take aspirin daily. Denies cp now.     Feeling better now. Wife in room.        Problem/Plan - 1:  ·  Problem: chest pain with tachycardia .   ·  Plan: -Had hernia surgery 2 weeks back. R/O PE . VQ scan pending. Doppler legs negative.   -given multiple cad risk factors, ischemic work up in progress. trop x 3 negative.   -Checking TFT also.  -cardiology help appreciated.   < from: Transthoracic Echocardiogram (05.08.23 @ 08:34) >  CONCLUSIONS:  1. Calcified trileaflet aortic valve with normal opening.  2. Normal left ventricular internal dimensions and wall  thicknesses.  3. Normal left ventricular systolic function. No segmental  wall motion abnormalities.  4. Normal left ventricular diastolic function.  5. Normal right ventricular size and function.3D RV EF 50%         Problem/Plan - 2:  ·  Problem: DM (diabetes mellitus).   ·  Plan: ISS. Sugars in good range.      Problem/Plan - 3:  ·  Problem: HTN (hypertension).   ·  Plan: BP readings better.      Problem/Plan - 4:  ·  Problem: HLD (hyperlipidemia).   ·  Plan: c/w statin.     Problem/Plan - 5:  ·  Problem: Lung nodules.   ·  Plan: Ex smoker oupt surveillance.     Problem/Plan - 6:  ·  Problem: Encounter for deep vein thrombosis (DVT) prophylaxis.   ·  Plan: Lovenox.

## 2023-05-08 NOTE — H&P ADULT - NSHPREVIEWOFSYSTEMS_GEN_ALL_CORE
Review of Systems:   CONSTITUTIONAL: No fever, weight loss  EYES: No eye pain, visual disturbances, or discharge  ENMT:  No difficulty hearing, tinnitus, vertigo; No sinus or throat pain  NECK: No pain or stiffness  RESPIRATORY: No cough, wheezing, chills or hemoptysis; No shortness of breath  CARDIOVASCULAR: No current chest pain, palpitations, dizziness, or leg swelling  GASTROINTESTINAL: No abdominal or epigastric pain. No nausea, vomiting, or hematemesis; No diarrhea or constipation. No melena or hematochezia.  GENITOURINARY: No dysuria, frequency, hematuria, or incontinence  NEUROLOGICAL: No headaches  SKIN: No itching, burning, rashes, or lesions   LYMPH NODES: No enlarged glands  MUSCULOSKELETAL: No joint pain or swelling; No muscle, back, or extremity pain  PSYCHIATRIC: anxious

## 2023-05-08 NOTE — DISCHARGE NOTE PROVIDER - NSDCCPCAREPLAN_GEN_ALL_CORE_FT
PRINCIPAL DISCHARGE DIAGNOSIS  Diagnosis: Chest pain  Assessment and Plan of Treatment: You were experiencing chest pain and fast heart rate, EKG and laboratory studies and a ventilation and perfusion scan were reassuring and do not indicate acute coronary syndrome or pulmonary embolism. If you begin to experience chest pain, shortness of breath or new symptoms please seek medical care. Please follow up with your outpatient doctor within one week.      SECONDARY DISCHARGE DIAGNOSES  Diagnosis: HTN (hypertension)  Assessment and Plan of Treatment: Continue blood pressure medication regimen as directed. Monitor for any visual changes, headaches or dizziness.  Monitor blood pressure regularly.  Follow up with your primary care provider or cardiologist for further management for high blood pressure.    Diagnosis: Tachycardia  Assessment and Plan of Treatment: You were experiencing chest pain and fast heart rate, EKG and laboratory studies and a ventilation and perfusion scan were reassuring and do not indicate acute coronary syndrome or pulmonary embolism. If you begin to experience chest pain, shortness of breath or new symptoms please seek medical care. Please follow up with your outpatient doctor within one week.    Diagnosis: HLD (hyperlipidemia)  Assessment and Plan of Treatment: Please continue your home cholesterol mediciation. Reduced intake of dietary cholesterol with a heart healthy diet and exercise is recommended.    Diagnosis: DM (diabetes mellitus)  Assessment and Plan of Treatment: Continue your medication regimen and a consistent carbohydrate diet (Meaning eating the same amount of carbohydrates at the same time each day). Monitor blood glucose levels throughout the day before meals and at bedtime. Record blood sugars and bring to outpatient providers appointment in order to be reviewed by your doctor for management modifications. If your sugars are more than 400 or less than 70 you should contact your PCP immediately. Monitor for signs/symptoms of low blood glucose, such as, dizziness, altered mental status, or cool/clammy skin. In addition, monitor for signs/symptoms of high blood glucose, such as, feeling hot, dry, fatigued, or with increased thirst/urination. Make regular podiatry appointments in order to have feet checked for wounds and uncontrolled toe nail growth to prevent infections, as well as, appointments with an ophthalmologist to monitor your vision.

## 2023-05-08 NOTE — DISCHARGE NOTE NURSING/CASE MANAGEMENT/SOCIAL WORK - NSDCPEFALRISK_GEN_ALL_CORE
For information on Fall & Injury Prevention, visit: [email or web address] OR  [email or web address] OR  https://www.cdc.gov/steadi/patient.html

## 2023-05-08 NOTE — H&P ADULT - NSHPPHYSICALEXAM_GEN_ALL_CORE
PHYSICAL EXAM:      Constitutional: NAD, well-groomed, well-developed  HEENT:  EOMI, Normal Hearing  Neck: No LAD, No JVD  Back: Normal spine flexure, No CVA tenderness  Respiratory: CTAB  Cardiovascular: S1 and S2, RRR  Gastrointestinal: BS+, soft, NT/ND  Extremities: No peripheral edema  Vascular: 2+ peripheral pulses  Neurological: A/O x 3, no focal deficits  Psychiatric: Normal mood, somewhat anxious  affect  Musculoskeletal: 5/5 strength b/l upper and lower extremities  Skin: No rashes

## 2023-08-21 NOTE — H&P ADULT - HISTORY OF PRESENT ILLNESS
discussed the case of Julian Escalona including pertinent history and exam findings with the student/resident/fellow. I have seen and examined the patient and the key elements of the encounter have been performed by me. I agree with the assessment, plan and orders as documented by the resident With changes made to the note.      Electronically signed by Bart Gaitan MD on 8/22/2023 at 2:45 PM.    [facility]      [phone] 56 yo m former smoker, HTN, HLD, DM2 presenting with 2 episodes of non-exertional left sided cp radiating to arm in last 1-2 days. Each episode lasted approx. 1 hour and subsided spontaneously. Denies fever, cough, leg swelling. Reported right  leg pain to ed earlier, duplex performed negative for dvt.  EKG LAFB, (seen on 2011 ekg) sinus tach 118 bpm. Pt does report feeling anxious currently. HS trop 6, 8. Does not take aspirin daily. Denies cp now. CT chest non-con unremarkable save for right sided pulm nodules  56 yo m former smoker, HTN, HLD, DM2 presenting with 2 episodes of non-exertional left sided cp radiating to arm in last 1-2 days. Each episode lasted approx. 1 hour and subsided spontaneously. Denies fever, cough, leg swelling. Reported right  leg pain to ed earlier, duplex performed negative for dvt.  EKG LAFB, (seen on 2011 ekg) sinus tach 118 bpm. Pt does report feeling anxious currently. HS trop 6, 8. Does not take aspirin daily. Denies cp now. CT chest non-con unremarkable save for right sided pulm nodules   CT head performed as family  reported to ed pt not acting himself recently; CT head unremarkable, pt a/o x 3 on my exam, somewhat anxious affect

## 2024-08-17 NOTE — ED ADULT NURSE NOTE - PRIMARY CARE PROVIDER
Patient arrived on a cart with EMS 2 assist transport. Patient alert and oriented x 4. Pleasant and calm. Released orders. Dr. Baptiste in to see patient. Patient Chest tube to -20 cm suction and no air leak and had 500 ml out at [facility] before arriving to room 319. Lungs diminished on right base. O2 at 5 liters per n/c sating 94%. Patient does wear Home Oxygen between 3 and 4 liters per n/c. Patient talked with family, Nephew Kt on the phone. Call light in reach. Two skin check done. Bed alarm on. Incentive Spirometer up to 500 ml's. Needs encouragement. Will report off to next shift RN.   unk

## 2025-05-20 ENCOUNTER — NON-APPOINTMENT (OUTPATIENT)
Age: 60
End: 2025-05-20

## 2025-05-22 ENCOUNTER — APPOINTMENT (OUTPATIENT)
Dept: NEUROLOGY | Facility: CLINIC | Age: 60
End: 2025-05-22
Payer: COMMERCIAL

## 2025-05-22 PROCEDURE — 95816 EEG AWAKE AND DROWSY: CPT
